# Patient Record
Sex: FEMALE | URBAN - METROPOLITAN AREA
[De-identification: names, ages, dates, MRNs, and addresses within clinical notes are randomized per-mention and may not be internally consistent; named-entity substitution may affect disease eponyms.]

---

## 2021-10-26 ENCOUNTER — OFFICE VISIT (OUTPATIENT)
Dept: URBAN - METROPOLITAN AREA CLINIC 11 | Facility: CLINIC | Age: 46
End: 2021-10-26
Payer: MEDICAID

## 2021-10-26 DIAGNOSIS — E11.3513 TYPE 2 DIABETES MELLITUS W/ PROLIFERATIVE DIABETIC RETINOPATHY W/ MACULAR EDEMA, BILATERAL: ICD-10-CM

## 2021-10-26 DIAGNOSIS — H04.123 DRY EYE SYNDROME OF BILATERAL LACRIMAL GLANDS: ICD-10-CM

## 2021-10-26 DIAGNOSIS — H25.13 AGE-RELATED NUCLEAR CATARACT, BILATERAL: Primary | ICD-10-CM

## 2021-10-26 PROCEDURE — 92004 COMPRE OPH EXAM NEW PT 1/>: CPT | Performed by: OPTOMETRIST

## 2021-10-26 PROCEDURE — 92134 CPTRZ OPH DX IMG PST SGM RTA: CPT | Performed by: OPTOMETRIST

## 2021-10-26 ASSESSMENT — KERATOMETRY
OD: 43.50
OS: 43.50

## 2021-10-26 ASSESSMENT — INTRAOCULAR PRESSURE
OD: 15
OS: 17

## 2021-10-26 ASSESSMENT — VISUAL ACUITY
OD: 20/25
OS: 20/25

## 2021-10-26 NOTE — IMPRESSION/PLAN
Impression: Type 2 diabetes mellitus w/ proliferative diabetic retinopathy w/ macular edema, bilateral: T61.3760. Plan: OCT of macula ordered and performed today ou. DME OU. Signs of neovascularization noted. Will refer patient for a retinal consult and tx with retinal specialist.  Discussed ocular and systemic benefits of maintaining blood sugar control.  Continue care with outside Retina specialist. Pt currently receiving injections with outside retina specialist.

## 2021-10-26 NOTE — IMPRESSION/PLAN
Impression: Age-related nuclear cataract, bilateral: H25.13. Plan: Discussed diagnosis in detail with patient. No treatment is required at this time. Will continue to observe condition and or symptoms. Call if [address] worsens.